# Patient Record
Sex: MALE | Race: WHITE | NOT HISPANIC OR LATINO | Employment: FULL TIME | URBAN - METROPOLITAN AREA
[De-identification: names, ages, dates, MRNs, and addresses within clinical notes are randomized per-mention and may not be internally consistent; named-entity substitution may affect disease eponyms.]

---

## 2019-11-04 ENCOUNTER — OFFICE VISIT (OUTPATIENT)
Dept: INTERNAL MEDICINE CLINIC | Facility: CLINIC | Age: 44
End: 2019-11-04
Payer: COMMERCIAL

## 2019-11-04 VITALS
OXYGEN SATURATION: 98 % | WEIGHT: 207.8 LBS | SYSTOLIC BLOOD PRESSURE: 132 MMHG | RESPIRATION RATE: 14 BRPM | HEART RATE: 88 BPM | DIASTOLIC BLOOD PRESSURE: 80 MMHG | BODY MASS INDEX: 29.82 KG/M2

## 2019-11-04 DIAGNOSIS — N52.9 VASCULOGENIC ERECTILE DYSFUNCTION, UNSPECIFIED VASCULOGENIC ERECTILE DYSFUNCTION TYPE: ICD-10-CM

## 2019-11-04 DIAGNOSIS — K40.90 RIGHT INGUINAL HERNIA: ICD-10-CM

## 2019-11-04 DIAGNOSIS — Z11.4 SCREENING FOR HIV (HUMAN IMMUNODEFICIENCY VIRUS): ICD-10-CM

## 2019-11-04 DIAGNOSIS — Z13.1 SCREENING FOR DIABETES MELLITUS: ICD-10-CM

## 2019-11-04 DIAGNOSIS — N20.0 NEPHROLITHIASIS: Primary | ICD-10-CM

## 2019-11-04 DIAGNOSIS — N50.811 RIGHT TESTICULAR PAIN: ICD-10-CM

## 2019-11-04 DIAGNOSIS — Z12.5 SCREENING FOR PROSTATE CANCER: ICD-10-CM

## 2019-11-04 DIAGNOSIS — Z13.29 SCREENING FOR THYROID DISORDER: ICD-10-CM

## 2019-11-04 DIAGNOSIS — Z11.59 ENCOUNTER FOR HEPATITIS C SCREENING TEST FOR LOW RISK PATIENT: ICD-10-CM

## 2019-11-04 DIAGNOSIS — Z13.220 SCREENING FOR HYPERLIPIDEMIA: ICD-10-CM

## 2019-11-04 DIAGNOSIS — Z23 INFLUENZA VACCINE NEEDED: ICD-10-CM

## 2019-11-04 PROCEDURE — 90686 IIV4 VACC NO PRSV 0.5 ML IM: CPT

## 2019-11-04 PROCEDURE — 99203 OFFICE O/P NEW LOW 30 MIN: CPT | Performed by: INTERNAL MEDICINE

## 2019-11-04 PROCEDURE — 90471 IMMUNIZATION ADMIN: CPT

## 2019-11-04 NOTE — PROGRESS NOTES
Assessment/Plan:    #Right Inguinal Pain  -present for several years   -reports dull ache and soreness in groin area down to testicle  -denies penile discharge, relation to exercise, swelling  -will obtain US imaging  -hernia exam possible positive for small hernia    #Vasectomy  -underwent previous surgery    #Erectile Dysfunction  -reports low libido with inability to perform  -will obtain testosterone  -defers ED meds for now    #Nephrolithiasis  -previous history with 2 stones requiring pain medication for treatment  -remains asymptomatic and drinks lots of water  -will obtain US imaging    #Obesity  BMI Counseling: Body mass index is 29 82 kg/m²  Discussed the patient's BMI with him  The BMI is above normal  Nutrition recommendations include decreasing overall calorie intake, 3-5 servings of fruits/vegetables daily and reducing fast food intake  Exercise recommendations include vigorous aerobic physical activity for 75 minutes/week  #Health Maintenance  -routine labs and return to care 1 year  -flu vaccine up to date today    Addendum 11/07/2019 testosterone 595, PSA 0 6, HIV and hepatitis negative, , HDL 86, TSH 1 25, A1c 5 4 patient opted for rectal dysfunction medications and gave him prescriptions for Cialis as well as viagra and he will check with his pharmacy to see which one is cheaper    Addendum 11/12/19 US abdomen unremarkable  US groin shows left varicocele and right renal cyst  Reports severe testicular pain on occasion and we will refer to urology  No problem-specific Assessment & Plan notes found for this encounter  Diagnoses and all orders for this visit:    Nephrolithiasis  -     US kidney and bladder; Future  -     CBC and differential; Future  -     Lipid Panel with Direct LDL reflex; Future  -     Hemoglobin A1C; Future  -     TSH, 3rd generation with Free T4 reflex; Future  -     PSA, total and free; Future  -     Comprehensive metabolic panel;  Future    Vasculogenic erectile dysfunction, unspecified vasculogenic erectile dysfunction type  -     Testosterone, free, total; Future  -     CBC and differential; Future  -     Lipid Panel with Direct LDL reflex; Future  -     Hemoglobin A1C; Future  -     TSH, 3rd generation with Free T4 reflex; Future  -     PSA, total and free; Future  -     Comprehensive metabolic panel; Future    Influenza vaccine needed  -     influenza vaccine, 5513-2150, quadrivalent, 0 5 mL, preservative-free, for adult and pediatric patients 6 mos+ (AFLURIA, FLUARIX, FLULAVAL, FLUZONE)  -     CBC and differential; Future  -     Lipid Panel with Direct LDL reflex; Future  -     Hemoglobin A1C; Future  -     TSH, 3rd generation with Free T4 reflex; Future  -     PSA, total and free; Future  -     Comprehensive metabolic panel; Future    Screening for diabetes mellitus  -     CBC and differential; Future  -     Lipid Panel with Direct LDL reflex; Future  -     Hemoglobin A1C; Future  -     TSH, 3rd generation with Free T4 reflex; Future  -     PSA, total and free; Future  -     Comprehensive metabolic panel; Future    Screening for hyperlipidemia  -     CBC and differential; Future  -     Lipid Panel with Direct LDL reflex; Future  -     Hemoglobin A1C; Future  -     TSH, 3rd generation with Free T4 reflex; Future  -     PSA, total and free; Future  -     Comprehensive metabolic panel; Future    Screening for prostate cancer  -     CBC and differential; Future  -     Lipid Panel with Direct LDL reflex; Future  -     Hemoglobin A1C; Future  -     TSH, 3rd generation with Free T4 reflex; Future  -     PSA, total and free; Future  -     Comprehensive metabolic panel; Future    Screening for thyroid disorder  -     CBC and differential; Future  -     Lipid Panel with Direct LDL reflex; Future  -     Hemoglobin A1C; Future  -     TSH, 3rd generation with Free T4 reflex; Future  -     PSA, total and free;  Future  -     Comprehensive metabolic panel; Future    Encounter for hepatitis C screening test for low risk patient  -     CBC and differential; Future  -     Lipid Panel with Direct LDL reflex; Future  -     Hemoglobin A1C; Future  -     TSH, 3rd generation with Free T4 reflex; Future  -     PSA, total and free; Future  -     Comprehensive metabolic panel; Future  -     Chronic Hepatitis Panel; Future    Screening for HIV (human immunodeficiency virus)  -     CBC and differential; Future  -     Lipid Panel with Direct LDL reflex; Future  -     Hemoglobin A1C; Future  -     TSH, 3rd generation with Free T4 reflex; Future  -     PSA, total and free; Future  -     Comprehensive metabolic panel; Future  -     HIV 1/2 AG-AB combo; Future    Right inguinal hernia  -     US scrotum and groin area; Future  -     CBC and differential; Future  -     Lipid Panel with Direct LDL reflex; Future  -     Hemoglobin A1C; Future  -     TSH, 3rd generation with Free T4 reflex; Future  -     PSA, total and free; Future  -     Comprehensive metabolic panel; Future          No current outpatient medications on file  Subjective:      Patient ID: Gene Vogel is a 40 y o  male  HPI     Patient presents for a new patient visit  Reports that he has not seen a primary care physician in many years and wanted to get evaluated  He states that he has been experiencing a right inguinal pain for the past several years  He denies any association with exercise however states that it feels like a soreness that radiates down to his testicle  He denies any swelling or bulge is however her knee exam today was noted for a mild small hernia  Will obtain ultrasound imaging of this to clarify this  He states that the pain comes down every couple weeks and usually dissipates on its own after a few days  Of note patient also has a history of erectile dysfunction is deferring any medications for this    He states that he does not feel of the toe and we will obtain a testosterone level to evaluate for this  Patient also has a history of renal stones and went to the emergency room twice for he received pain medications and passed a stone approximately 2-3 days later  States that he continues to drink a lot a water it remains hydrated  We will obtain an ultrasound imaging of his renal system to evaluate for repeat stone  Patient also has a history of a vasectomy however no other surgeries and no other medical problems  He denies any drug allergies  He does not take any medications at this time  Denies any smoking, drug use  He states that he drinks alcohol on a weekly basis on occasion  Received a flu vaccine today  Reports a family history of melanoma in his mother and pancreatic cancer in his grandmother  He states that he continues to diet and exercise on a daily basis  Patient will return to care in 1 year however he will obtain routine labs today  The following portions of the patient's history were reviewed and updated as appropriate: allergies, current medications, past family history, past medical history, past social history, past surgical history and problem list     Review of Systems   Constitutional: Negative for activity change, appetite change, fatigue and fever  HENT: Negative for congestion, ear pain, hearing loss, sore throat and tinnitus  Eyes: Negative for photophobia, pain and visual disturbance  Respiratory: Negative for cough, shortness of breath and wheezing  Cardiovascular: Negative for chest pain, palpitations and leg swelling  Gastrointestinal: Negative for abdominal distention, abdominal pain, constipation, diarrhea, nausea and vomiting  Genitourinary: Positive for testicular pain  Negative for difficulty urinating, flank pain, frequency, hematuria, penile swelling and scrotal swelling          Right groin pain, erectile dysfunction   Musculoskeletal: Negative for arthralgias, back pain, gait problem, joint swelling, myalgias, neck pain and neck stiffness  Skin: Negative for color change, pallor, rash and wound  Neurological: Negative for dizziness, tremors, syncope, weakness, light-headedness, numbness and headaches  Hematological: Does not bruise/bleed easily  Objective:      /80 (BP Location: Left arm, Patient Position: Sitting, Cuff Size: Adult)   Pulse 88   Resp 14   Wt 94 3 kg (207 lb 12 8 oz)   SpO2 98%   BMI 29 82 kg/m²          Physical Exam   Constitutional: He is oriented to person, place, and time  He appears well-developed and well-nourished  HENT:   Head: Normocephalic and atraumatic  Right Ear: External ear normal    Left Ear: External ear normal    Nose: Nose normal    Mouth/Throat: Oropharynx is clear and moist    Eyes: Pupils are equal, round, and reactive to light  Conjunctivae and EOM are normal    Neck: Normal range of motion  Neck supple  No JVD present  No thyromegaly present  Cardiovascular: Normal rate, regular rhythm, normal heart sounds and intact distal pulses  No murmur heard  Pulmonary/Chest: Effort normal and breath sounds normal  No stridor  No respiratory distress  He has no wheezes  He has no rales  He exhibits no tenderness  Abdominal: Soft  Bowel sounds are normal  He exhibits no distension and no mass  There is no tenderness  There is no rebound and no guarding  A hernia (possible hernia right inguinal) is present  Genitourinary: Penis normal    Musculoskeletal: Normal range of motion  He exhibits no edema, tenderness or deformity  Lymphadenopathy:     He has no cervical adenopathy  Neurological: He is alert and oriented to person, place, and time  He has normal reflexes  Skin: Skin is warm and dry  No rash noted  No erythema  No pallor  Vitals reviewed

## 2019-11-05 ENCOUNTER — APPOINTMENT (OUTPATIENT)
Dept: LAB | Facility: AMBULARY SURGERY CENTER | Age: 44
End: 2019-11-05
Payer: COMMERCIAL

## 2019-11-05 DIAGNOSIS — N20.0 NEPHROLITHIASIS: ICD-10-CM

## 2019-11-05 DIAGNOSIS — N52.9 VASCULOGENIC ERECTILE DYSFUNCTION, UNSPECIFIED VASCULOGENIC ERECTILE DYSFUNCTION TYPE: ICD-10-CM

## 2019-11-05 DIAGNOSIS — Z23 INFLUENZA VACCINE NEEDED: ICD-10-CM

## 2019-11-05 DIAGNOSIS — K40.90 RIGHT INGUINAL HERNIA: ICD-10-CM

## 2019-11-05 DIAGNOSIS — Z13.220 SCREENING FOR HYPERLIPIDEMIA: ICD-10-CM

## 2019-11-05 DIAGNOSIS — Z11.4 SCREENING FOR HIV (HUMAN IMMUNODEFICIENCY VIRUS): ICD-10-CM

## 2019-11-05 DIAGNOSIS — Z13.29 SCREENING FOR THYROID DISORDER: ICD-10-CM

## 2019-11-05 DIAGNOSIS — Z12.5 SCREENING FOR PROSTATE CANCER: ICD-10-CM

## 2019-11-05 DIAGNOSIS — Z13.1 SCREENING FOR DIABETES MELLITUS: ICD-10-CM

## 2019-11-05 DIAGNOSIS — Z11.59 ENCOUNTER FOR HEPATITIS C SCREENING TEST FOR LOW RISK PATIENT: ICD-10-CM

## 2019-11-05 LAB
ALBUMIN SERPL BCP-MCNC: 4.2 G/DL (ref 3.5–5)
ALP SERPL-CCNC: 78 U/L (ref 46–116)
ALT SERPL W P-5'-P-CCNC: 23 U/L (ref 12–78)
ANION GAP SERPL CALCULATED.3IONS-SCNC: 6 MMOL/L (ref 4–13)
AST SERPL W P-5'-P-CCNC: 16 U/L (ref 5–45)
BASOPHILS # BLD AUTO: 0.05 THOUSANDS/ΜL (ref 0–0.1)
BASOPHILS NFR BLD AUTO: 1 % (ref 0–1)
BILIRUB SERPL-MCNC: 0.79 MG/DL (ref 0.2–1)
BUN SERPL-MCNC: 17 MG/DL (ref 5–25)
CALCIUM SERPL-MCNC: 9.2 MG/DL (ref 8.3–10.1)
CHLORIDE SERPL-SCNC: 104 MMOL/L (ref 100–108)
CHOLEST SERPL-MCNC: 199 MG/DL (ref 50–200)
CO2 SERPL-SCNC: 28 MMOL/L (ref 21–32)
CREAT SERPL-MCNC: 1.07 MG/DL (ref 0.6–1.3)
EOSINOPHIL # BLD AUTO: 0.17 THOUSAND/ΜL (ref 0–0.61)
EOSINOPHIL NFR BLD AUTO: 4 % (ref 0–6)
ERYTHROCYTE [DISTWIDTH] IN BLOOD BY AUTOMATED COUNT: 12.6 % (ref 11.6–15.1)
EST. AVERAGE GLUCOSE BLD GHB EST-MCNC: 108 MG/DL
GFR SERPL CREATININE-BSD FRML MDRD: 84 ML/MIN/1.73SQ M
GLUCOSE P FAST SERPL-MCNC: 99 MG/DL (ref 65–99)
HBA1C MFR BLD: 5.4 % (ref 4.2–6.3)
HBV CORE AB SER QL: NORMAL
HBV CORE IGM SER QL: NORMAL
HBV SURFACE AG SER QL: NORMAL
HCT VFR BLD AUTO: 44.4 % (ref 36.5–49.3)
HCV AB SER QL: NORMAL
HDLC SERPL-MCNC: 86 MG/DL
HGB BLD-MCNC: 14.5 G/DL (ref 12–17)
IMM GRANULOCYTES # BLD AUTO: 0 THOUSAND/UL (ref 0–0.2)
IMM GRANULOCYTES NFR BLD AUTO: 0 % (ref 0–2)
LDLC SERPL CALC-MCNC: 103 MG/DL (ref 0–100)
LYMPHOCYTES # BLD AUTO: 1.03 THOUSANDS/ΜL (ref 0.6–4.47)
LYMPHOCYTES NFR BLD AUTO: 23 % (ref 14–44)
MCH RBC QN AUTO: 30.9 PG (ref 26.8–34.3)
MCHC RBC AUTO-ENTMCNC: 32.7 G/DL (ref 31.4–37.4)
MCV RBC AUTO: 95 FL (ref 82–98)
MONOCYTES # BLD AUTO: 0.5 THOUSAND/ΜL (ref 0.17–1.22)
MONOCYTES NFR BLD AUTO: 11 % (ref 4–12)
NEUTROPHILS # BLD AUTO: 2.79 THOUSANDS/ΜL (ref 1.85–7.62)
NEUTS SEG NFR BLD AUTO: 61 % (ref 43–75)
NRBC BLD AUTO-RTO: 0 /100 WBCS
PLATELET # BLD AUTO: 213 THOUSANDS/UL (ref 149–390)
PMV BLD AUTO: 10.2 FL (ref 8.9–12.7)
POTASSIUM SERPL-SCNC: 4.2 MMOL/L (ref 3.5–5.3)
PROT SERPL-MCNC: 7.4 G/DL (ref 6.4–8.2)
RBC # BLD AUTO: 4.7 MILLION/UL (ref 3.88–5.62)
SODIUM SERPL-SCNC: 138 MMOL/L (ref 136–145)
TRIGL SERPL-MCNC: 52 MG/DL
TSH SERPL DL<=0.05 MIU/L-ACNC: 1.25 UIU/ML (ref 0.36–3.74)
WBC # BLD AUTO: 4.54 THOUSAND/UL (ref 4.31–10.16)

## 2019-11-05 PROCEDURE — 87340 HEPATITIS B SURFACE AG IA: CPT

## 2019-11-05 PROCEDURE — 84402 ASSAY OF FREE TESTOSTERONE: CPT

## 2019-11-05 PROCEDURE — 87389 HIV-1 AG W/HIV-1&-2 AB AG IA: CPT

## 2019-11-05 PROCEDURE — 85025 COMPLETE CBC W/AUTO DIFF WBC: CPT

## 2019-11-05 PROCEDURE — 84403 ASSAY OF TOTAL TESTOSTERONE: CPT

## 2019-11-05 PROCEDURE — 36415 COLL VENOUS BLD VENIPUNCTURE: CPT

## 2019-11-05 PROCEDURE — 84154 ASSAY OF PSA FREE: CPT

## 2019-11-05 PROCEDURE — 80053 COMPREHEN METABOLIC PANEL: CPT

## 2019-11-05 PROCEDURE — 84153 ASSAY OF PSA TOTAL: CPT

## 2019-11-05 PROCEDURE — 80061 LIPID PANEL: CPT

## 2019-11-05 PROCEDURE — 83036 HEMOGLOBIN GLYCOSYLATED A1C: CPT

## 2019-11-05 PROCEDURE — 86704 HEP B CORE ANTIBODY TOTAL: CPT

## 2019-11-05 PROCEDURE — 84443 ASSAY THYROID STIM HORMONE: CPT

## 2019-11-05 PROCEDURE — 86705 HEP B CORE ANTIBODY IGM: CPT

## 2019-11-05 PROCEDURE — 86803 HEPATITIS C AB TEST: CPT

## 2019-11-06 LAB
HIV 1+2 AB+HIV1 P24 AG SERPL QL IA: NORMAL
PSA FREE MFR SERPL: 35 %
PSA FREE SERPL-MCNC: 0.21 NG/ML
PSA SERPL-MCNC: 0.6 NG/ML (ref 0–4)
TESTOST FREE SERPL-MCNC: 9.6 PG/ML (ref 6.8–21.5)
TESTOST SERPL-MCNC: 595 NG/DL (ref 264–916)

## 2019-11-11 ENCOUNTER — HOSPITAL ENCOUNTER (OUTPATIENT)
Dept: ULTRASOUND IMAGING | Facility: HOSPITAL | Age: 44
Discharge: HOME | End: 2019-11-11
Payer: COMMERCIAL

## 2019-11-11 DIAGNOSIS — K40.90 RIGHT INGUINAL HERNIA: ICD-10-CM

## 2019-11-11 DIAGNOSIS — N20.0 NEPHROLITHIASIS: ICD-10-CM

## 2019-11-11 PROCEDURE — 76870 US EXAM SCROTUM: CPT

## 2019-11-11 PROCEDURE — 76770 US EXAM ABDO BACK WALL COMP: CPT

## 2021-03-31 DIAGNOSIS — Z23 ENCOUNTER FOR IMMUNIZATION: ICD-10-CM

## 2021-08-23 ENCOUNTER — TELEPHONE (OUTPATIENT)
Dept: INTERNAL MEDICINE CLINIC | Facility: CLINIC | Age: 46
End: 2021-08-23

## 2022-08-21 ENCOUNTER — OFFICE VISIT (OUTPATIENT)
Dept: URGENT CARE | Facility: MEDICAL CENTER | Age: 47
End: 2022-08-21
Payer: COMMERCIAL

## 2022-08-21 VITALS
BODY MASS INDEX: 34.44 KG/M2 | HEART RATE: 84 BPM | TEMPERATURE: 98 F | WEIGHT: 246 LBS | SYSTOLIC BLOOD PRESSURE: 142 MMHG | HEIGHT: 71 IN | OXYGEN SATURATION: 96 % | RESPIRATION RATE: 18 BRPM | DIASTOLIC BLOOD PRESSURE: 88 MMHG

## 2022-08-21 DIAGNOSIS — S91.209A AVULSION OF TOENAIL, INITIAL ENCOUNTER: Primary | ICD-10-CM

## 2022-08-21 PROCEDURE — G0382 LEV 3 HOSP TYPE B ED VISIT: HCPCS | Performed by: PHYSICIAN ASSISTANT

## 2022-08-21 NOTE — PATIENT INSTRUCTIONS
Avulsion toenail  Follow up with PCP in 3-5 days  Proceed to  ER if symptoms worsen  Nail Avulsion   WHAT YOU NEED TO KNOW:   Nail avulsion is when part or all of a nail is torn away or removed from the nail bed  Avulsion may happen on your finger or toe  Common causes include ingrown nail, injury, or infection  The nail bed will form a hard layer and then a new nail may grow  The nail bed will be sensitive until the hard layer forms  You will need to keep it covered to prevent infection or more injury  You may need to care for your nail area for several months as the new nail grows  DISCHARGE INSTRUCTIONS:   Return to the emergency department if:   Blood soaks through your bandage  You have a red streak running up your leg or arm  Contact your healthcare provider if:   You have a fever or chills  Your injured area is red, swollen, or draining pus  You have new or worsening pain, or pain that does not get better with medicine  You have questions or concerns about your condition or care  Medicines:   Antibiotics  may help treat or prevent a bacterial infection  Acetaminophen  decreases pain and fever  It is available without a doctor's order  Ask how much to take and how often to take it  Follow directions  Acetaminophen can cause liver damage if not taken correctly  NSAIDs , such as ibuprofen, help decrease swelling, pain, and fever  This medicine is available with or without a doctor's order  NSAIDs can cause stomach bleeding or kidney problems in certain people  If you take blood thinner medicine, always ask your healthcare provider if NSAIDs are safe for you  Always read the medicine label and follow directions  Take your medicine as directed  Contact your healthcare provider if you think your medicine is not helping or if you have side effects  Tell him or her if you are allergic to any medicine  Keep a list of the medicines, vitamins, and herbs you take   Include the amounts, and when and why you take them  Bring the list or the pill bottles to follow-up visits  Carry your medicine list with you in case of an emergency  Self-care:   Keep your nail area clean, dry, and covered  When you are allowed to bathe, carefully wash the area with soap and water  Put on a clean, new bandage  Do not use an adhesive bandage  It may stick to the wound and cause pain when you remove it  Ask your healthcare provider what kind of bandage to use  Change your bandage when it gets wet or dirty  Your healthcare provider may suggest that you change the bandage every 24 hours for the first few days  Elevate  your hand or foot above the level of your heart as often as you can for 24 hours  This will help decrease swelling and pain  Prop your hand or foot on pillows or blankets to keep it elevated comfortably  Apply ice  on your wound area for 15 to 20 minutes every hour or as directed  Use an ice pack, or put crushed ice in a plastic bag  Cover it with a towel  Ice helps prevent tissue damage and decreases swelling and pain  Do not wear tight shoes  or shoes that do not fit well  Do not wear tights or pantyhose  Wear cotton socks  Ask  when you can return to work, school, or your usual sports and activities  Follow up with your healthcare provider as directed: You may be referred to a hand or foot specialist  Write down your questions so you remember to ask them during your visits  © Copyright [employer] 2022 Information is for End User's use only and may not be sold, redistributed or otherwise used for commercial purposes  All illustrations and images included in CareNotes® are the copyrighted property of A D A M , Inc  or Grace Florez  The above information is an  only  It is not intended as medical advice for individual conditions or treatments   Talk to your doctor, nurse or pharmacist before following any medical regimen to see if it is safe and effective for you

## 2022-08-21 NOTE — PROGRESS NOTES
[facility]        NAME: Carroll Fleming is a 55 y o  male  : 1975    MRN: [id number]  DATE: 2022  TIME: 5:56 PM    Assessment and Plan   Avulsion of toenail, initial encounter [S91 [id number]]  1  Avulsion of toenail, initial encounter           Patient Instructions     Avulsion toenail  Follow up with PCP in 3-5 days  Proceed to  ER if symptoms worsen  Chief Complaint     Chief Complaint   Patient presents with    Toe Injury     Pt  Stubbed his left great toe a couple months ago  He is concerned about his toenail  History of Present Illness       69-year-old male who presents complaining of toenail falling off  Patient states that he stumped his toe approximately 2 months ago and today he noticed that the nail was loose  Patient denies redness, pain, swelling, discharge      Review of Systems   Review of Systems   Constitutional: Negative  HENT: Negative  Eyes: Negative  Respiratory: Negative  Negative for apnea, cough, choking, chest tightness, shortness of breath, wheezing and stridor  Cardiovascular: Negative  Negative for chest pain  Musculoskeletal: Positive for arthralgias  Current Medications     No current outpatient medications on file  Current Allergies     Allergies as of 2022    (No Known Allergies)            The following portions of the patient's history were reviewed and updated as appropriate: allergies, current medications, past family history, past medical history, past social history, past surgical history and problem list      Past Medical History:   Diagnosis Date    Arthritis 2005    Kidney stone 2009       No past surgical history on file  Family History   Problem Relation Age of Onset    Arthritis Mother         knees    Cancer Mother         melanoma    Arthritis Father         knees    Cancer Maternal Grandmother         pancreatic cancer         Medications have been verified          Objective   /88 Pulse 84   Temp 98 °F (36 7 °C)   Resp 18   Ht 5' 11" (1 803 m)   Wt 112 kg (246 lb)   SpO2 96%   BMI 34 31 kg/m²        Physical Exam     Physical Exam  Constitutional:       General: He is not in acute distress  Appearance: He is well-developed  He is not diaphoretic  Cardiovascular:      Rate and Rhythm: Normal rate and regular rhythm  Heart sounds: Normal heart sounds  Pulmonary:      Effort: Pulmonary effort is normal  No respiratory distress  Breath sounds: Normal breath sounds  No wheezing or rales  Chest:      Chest wall: No tenderness  Musculoskeletal:      Cervical back: Normal range of motion and neck supple  Legs:    Lymphadenopathy:      Cervical: No cervical adenopathy

## 2024-10-01 NOTE — PROGRESS NOTES
Ambulatory Visit  Name: Oscar Sellers      : 1975      MRN: [id number]  Encounter Provider: Alejandro Nj MD  Encounter Date: 10/2/2024   Encounter department: [facility]    Assessment & Plan  Well adult exam           Depression Screening and Follow-up Plan: Patient was screened for depression during today's encounter. They screened negative with a PHQ-2 score of 0.  History of Present Illness     HPI      Review of Systems        Objective     There were no vitals taken for this visit.    Physical Exam

## 2024-10-02 ENCOUNTER — OFFICE VISIT (OUTPATIENT)
Dept: FAMILY MEDICINE CLINIC | Facility: CLINIC | Age: 49
End: 2024-10-02
Payer: COMMERCIAL

## 2024-10-02 VITALS
SYSTOLIC BLOOD PRESSURE: 136 MMHG | DIASTOLIC BLOOD PRESSURE: 90 MMHG | HEIGHT: 71 IN | TEMPERATURE: 99.3 F | HEART RATE: 79 BPM | WEIGHT: 191 LBS | BODY MASS INDEX: 26.74 KG/M2 | OXYGEN SATURATION: 99 %

## 2024-10-02 DIAGNOSIS — R53.83 OTHER FATIGUE: ICD-10-CM

## 2024-10-02 DIAGNOSIS — Z13.220 SCREENING CHOLESTEROL LEVEL: ICD-10-CM

## 2024-10-02 DIAGNOSIS — M75.52 BURSITIS OF LEFT SHOULDER: Primary | ICD-10-CM

## 2024-10-02 PROCEDURE — 99213 OFFICE O/P EST LOW 20 MIN: CPT | Performed by: FAMILY MEDICINE

## 2024-10-02 RX ORDER — NAPROXEN 500 MG/1
500 TABLET ORAL 2 TIMES DAILY WITH MEALS
Qty: 20 TABLET | Refills: 1 | Status: SHIPPED | OUTPATIENT
Start: 2024-10-02

## 2024-10-03 ENCOUNTER — APPOINTMENT (OUTPATIENT)
Dept: LAB | Facility: MEDICAL CENTER | Age: 49
End: 2024-10-03
Payer: COMMERCIAL

## 2024-10-03 ENCOUNTER — APPOINTMENT (OUTPATIENT)
Dept: RADIOLOGY | Facility: MEDICAL CENTER | Age: 49
End: 2024-10-03
Payer: COMMERCIAL

## 2024-10-03 DIAGNOSIS — M75.52 BURSITIS OF LEFT SHOULDER: ICD-10-CM

## 2024-10-03 DIAGNOSIS — R53.83 OTHER FATIGUE: ICD-10-CM

## 2024-10-03 DIAGNOSIS — Z13.220 SCREENING CHOLESTEROL LEVEL: ICD-10-CM

## 2024-10-03 LAB
ALBUMIN SERPL BCG-MCNC: 4.3 G/DL (ref 3.5–5)
ALP SERPL-CCNC: 64 U/L (ref 34–104)
ALT SERPL W P-5'-P-CCNC: 28 U/L (ref 7–52)
ANION GAP SERPL CALCULATED.3IONS-SCNC: 6 MMOL/L (ref 4–13)
AST SERPL W P-5'-P-CCNC: 25 U/L (ref 13–39)
BASOPHILS # BLD AUTO: 0.03 THOUSANDS/ΜL (ref 0–0.1)
BASOPHILS NFR BLD AUTO: 1 % (ref 0–1)
BILIRUB SERPL-MCNC: 0.44 MG/DL (ref 0.2–1)
BILIRUB UR QL STRIP: NEGATIVE
BUN SERPL-MCNC: 28 MG/DL (ref 5–25)
CALCIUM SERPL-MCNC: 8.9 MG/DL (ref 8.4–10.2)
CHLORIDE SERPL-SCNC: 103 MMOL/L (ref 96–108)
CHOLEST SERPL-MCNC: 172 MG/DL
CLARITY UR: CLEAR
CO2 SERPL-SCNC: 30 MMOL/L (ref 21–32)
COLOR UR: COLORLESS
CREAT SERPL-MCNC: 1.09 MG/DL (ref 0.6–1.3)
EOSINOPHIL # BLD AUTO: 0.08 THOUSAND/ΜL (ref 0–0.61)
EOSINOPHIL NFR BLD AUTO: 2 % (ref 0–6)
ERYTHROCYTE [DISTWIDTH] IN BLOOD BY AUTOMATED COUNT: 12.3 % (ref 11.6–15.1)
GFR SERPL CREATININE-BSD FRML MDRD: 79 ML/MIN/1.73SQ M
GLUCOSE P FAST SERPL-MCNC: 90 MG/DL (ref 65–99)
GLUCOSE UR STRIP-MCNC: NEGATIVE MG/DL
HCT VFR BLD AUTO: 43.6 % (ref 36.5–49.3)
HDLC SERPL-MCNC: 74 MG/DL
HGB BLD-MCNC: 14.2 G/DL (ref 12–17)
HGB UR QL STRIP.AUTO: NEGATIVE
IMM GRANULOCYTES # BLD AUTO: 0.01 THOUSAND/UL (ref 0–0.2)
IMM GRANULOCYTES NFR BLD AUTO: 0 % (ref 0–2)
KETONES UR STRIP-MCNC: NEGATIVE MG/DL
LDLC SERPL CALC-MCNC: 90 MG/DL (ref 0–100)
LEUKOCYTE ESTERASE UR QL STRIP: NEGATIVE
LYMPHOCYTES # BLD AUTO: 2.05 THOUSANDS/ΜL (ref 0.6–4.47)
LYMPHOCYTES NFR BLD AUTO: 41 % (ref 14–44)
MAGNESIUM SERPL-MCNC: 2.3 MG/DL (ref 1.9–2.7)
MCH RBC QN AUTO: 31.6 PG (ref 26.8–34.3)
MCHC RBC AUTO-ENTMCNC: 32.6 G/DL (ref 31.4–37.4)
MCV RBC AUTO: 97 FL (ref 82–98)
MONOCYTES # BLD AUTO: 0.66 THOUSAND/ΜL (ref 0.17–1.22)
MONOCYTES NFR BLD AUTO: 13 % (ref 4–12)
NEUTROPHILS # BLD AUTO: 2.12 THOUSANDS/ΜL (ref 1.85–7.62)
NEUTS SEG NFR BLD AUTO: 43 % (ref 43–75)
NITRITE UR QL STRIP: NEGATIVE
NRBC BLD AUTO-RTO: 0 /100 WBCS
PH UR STRIP.AUTO: 6.5 [PH]
PLATELET # BLD AUTO: 253 THOUSANDS/UL (ref 149–390)
PMV BLD AUTO: 10.3 FL (ref 8.9–12.7)
POTASSIUM SERPL-SCNC: 4.7 MMOL/L (ref 3.5–5.3)
PROT SERPL-MCNC: 6.5 G/DL (ref 6.4–8.4)
PROT UR STRIP-MCNC: NEGATIVE MG/DL
RBC # BLD AUTO: 4.49 MILLION/UL (ref 3.88–5.62)
SODIUM SERPL-SCNC: 139 MMOL/L (ref 135–147)
SP GR UR STRIP.AUTO: 1.01 (ref 1–1.03)
TRIGL SERPL-MCNC: 41 MG/DL
TSH SERPL DL<=0.05 MIU/L-ACNC: 1.32 UIU/ML (ref 0.45–4.5)
URATE SERPL-MCNC: 3.4 MG/DL (ref 3.5–8.5)
UROBILINOGEN UR STRIP-ACNC: <2 MG/DL
WBC # BLD AUTO: 4.95 THOUSAND/UL (ref 4.31–10.16)

## 2024-10-03 PROCEDURE — 73030 X-RAY EXAM OF SHOULDER: CPT

## 2024-10-03 PROCEDURE — 80061 LIPID PANEL: CPT

## 2024-10-03 PROCEDURE — 84443 ASSAY THYROID STIM HORMONE: CPT

## 2024-10-03 PROCEDURE — 80053 COMPREHEN METABOLIC PANEL: CPT

## 2024-10-03 PROCEDURE — 81003 URINALYSIS AUTO W/O SCOPE: CPT

## 2024-10-03 PROCEDURE — 83735 ASSAY OF MAGNESIUM: CPT

## 2024-10-03 PROCEDURE — 84550 ASSAY OF BLOOD/URIC ACID: CPT

## 2024-10-03 PROCEDURE — 36415 COLL VENOUS BLD VENIPUNCTURE: CPT

## 2024-10-03 PROCEDURE — 85025 COMPLETE CBC W/AUTO DIFF WBC: CPT

## 2024-12-09 DIAGNOSIS — M75.52 BURSITIS OF LEFT SHOULDER: Primary | ICD-10-CM

## 2024-12-16 ENCOUNTER — OFFICE VISIT (OUTPATIENT)
Dept: OBGYN CLINIC | Facility: CLINIC | Age: 49
End: 2024-12-16
Payer: COMMERCIAL

## 2024-12-16 VITALS
HEIGHT: 71 IN | RESPIRATION RATE: 18 BRPM | HEART RATE: 67 BPM | OXYGEN SATURATION: 99 % | BODY MASS INDEX: 26.1 KG/M2 | DIASTOLIC BLOOD PRESSURE: 80 MMHG | WEIGHT: 186.4 LBS | SYSTOLIC BLOOD PRESSURE: 126 MMHG

## 2024-12-16 DIAGNOSIS — M75.52 BURSITIS OF LEFT SHOULDER: ICD-10-CM

## 2024-12-16 DIAGNOSIS — M75.42 IMPINGEMENT SYNDROME OF LEFT SHOULDER: Primary | ICD-10-CM

## 2024-12-16 PROCEDURE — 99203 OFFICE O/P NEW LOW 30 MIN: CPT | Performed by: STUDENT IN AN ORGANIZED HEALTH CARE EDUCATION/TRAINING PROGRAM

## 2024-12-16 RX ORDER — MELOXICAM 7.5 MG/1
7.5 TABLET ORAL DAILY
Qty: 30 TABLET | Refills: 2 | Status: SHIPPED | OUTPATIENT
Start: 2024-12-16

## 2024-12-16 NOTE — PROGRESS NOTES
ASSESSMENT/PLAN:    Diagnoses and all orders for this visit:    Impingement syndrome of left shoulder  -     Ambulatory Referral to Physical Therapy; Future  -     meloxicam (Mobic) 7.5 mg tablet; Take 1 tablet (7.5 mg total) by mouth daily    Bursitis of left shoulder  -     Ambulatory Referral to Orthopedic Surgery      Discussed history, exam, and imaging with patient. Presentation most consistent with left shoulder impingement and we will plan for non-operative management at this time.  Discussed oral/topical medication regimen. Will plan for oral meloxicam and tylenol. Also discussed topical Voltaren gel.  Discussed injections as a pain adjunct. Discussed CSI into the subacromial space which can be considered if he continues to have pain with conservative treatment.  Discussed rehabilitation efforts. Will plan for formal physical therapy for periscapular strengthening.  Discussed advanced imaging in the form of MRI. Discussed that MRI can be considered if he fails conservative treatments. No indication for MRI currently as there is not a concern for large rotator cuff tear given his history and exam.  Follow-up with me in 7 weeks after 6 week course of physical therapy.  _____________________________________________________  CHIEF COMPLAINT:  Chief Complaint   Patient presents with    Left Shoulder - Pain     Patient dealing with pain for a couple of months. Patient states that he took naproxen and showed some relief and now is having sharp pains          SUBJECTIVE:  Oscar Sellers is a 49 y.o. year old male, who presents for evaluation of left shoulder pain. The issue began months ago, with no specific injury. Pain is in the anterior shoulder with radiation down the anterior and lateral upper arm. He notes the pain is often sharp and is also dull at times. He notes that he does a lot of work around the house and outside in the yard. Bandar is intermittent throughout the day but typically worse after a busier  day. He denies numbness or tingling into the distal extremity. He saw his PCP who got xrays and ordered a short course of naproxen. He had pain relief for about 2 weeks but then pain returned. He denies formal physical therapy, or injections in the past.      PAST MEDICAL HISTORY:  Past Medical History:   Diagnosis Date    Arthritis     Kidney stone 2009       PAST SURGICAL HISTORY:  History reviewed. No pertinent surgical history.    FAMILY HISTORY:  Family History   Problem Relation Age of Onset    Arthritis Mother         knees    Cancer Mother         melanoma    Arthritis Father         knees    Cancer Maternal Grandmother         pancreatic cancer       SOCIAL HISTORY:  Social History     Tobacco Use    Smoking status: Former     Current packs/day: 0.00     Average packs/day: 1 pack/day for 15.0 years (15.0 ttl pk-yrs)     Types: Cigarettes     Start date: 10/2/1993     Quit date: 10/2/2008     Years since quittin.2     Passive exposure: Past    Smokeless tobacco: Never    Tobacco comments:     Never smoker - As per [place]    Vaping Use    Vaping status: Never Used   Substance Use Topics    Alcohol use: Not Currently     Alcohol/week: 2.0 standard drinks of alcohol    Drug use: Never       MEDICATIONS:    Current Outpatient Medications:     meloxicam (Mobic) 7.5 mg tablet, Take 1 tablet (7.5 mg total) by mouth daily, Disp: 30 tablet, Rfl: 2    ALLERGIES:  No Known Allergies    Review of systems:   Constitutional: Negative for fatigue, fever or loss of apetite.   HENT: Negative.    Respiratory: Negative for shortness of breath, dyspnea.    Cardiovascular: Negative for chest pain/tightness.   Gastrointestinal: Negative for abdominal pain, N/V.   Endocrine: Negative for cold/heat intolerance, unexplained weight loss/gain.   Genitourinary: Negative for flank pain, dysuria, hematuria.   Musculoskeletal: As in HPI  Skin: Negative for rash.    Neurological: Negative for numbness or  "tingling  Psychiatric/Behavioral: Negative for agitation.  _____________________________________________________  PHYSICAL EXAMINATION:    Blood pressure 126/80, pulse 67, resp. rate 18, height 5' 11\" (1.803 m), weight 84.6 kg (186 lb 6.4 oz), SpO2 99%.    General: well developed, well nourished\", alert and oriented times 3, no acute distress  HEENT: Benign, normocephalic, atraumatic  Cardiovascular: regular rate    Pulmonary: No wheezing or stridor  Abdomen: Soft, Nontender  Skin: No open wounds, erythema, rash  Neurovascular: per examination below    MUSCULOSKELETAL EXAMINATION:    Left shoulder exam  No bruising, swelling or deformity  NonTTP cervical spine, clavicle, AC joint, acromion, scapular spine, posterior joint line, deltoid, bicipital groove  TTP: anterior joint line  Active ROM  Forward flexion: 170  External rotation in adduction: 70  Internal rotation: thoracic spine  Strength Testin/5 with Harika  5/5 ER in adduction  Provocative maneuvers:   +: n/a  -: belly press, lift off, drop arm, speeds, obriens, yergasons  SILT C5-T1  2+ Radial pulse, symmetric with contralateral        _____________________________________________________  STUDIES REVIEWED:  I have personally reviewed pertinent films in PACS and my interpretation is:    Xrays of the left shoulder taken on 10/3/24 demonstrate well preserved joint space with no acute fracture or dislocation.        Ricardo Delacruz PA-C    "

## 2025-01-06 ENCOUNTER — EVALUATION (OUTPATIENT)
Dept: PHYSICAL THERAPY | Facility: CLINIC | Age: 50
End: 2025-01-06
Payer: COMMERCIAL

## 2025-01-06 DIAGNOSIS — M75.42 IMPINGEMENT SYNDROME OF LEFT SHOULDER: Primary | ICD-10-CM

## 2025-01-06 PROCEDURE — 97161 PT EVAL LOW COMPLEX 20 MIN: CPT | Performed by: PHYSICAL THERAPIST

## 2025-01-06 NOTE — PROGRESS NOTES
PT Evaluation     Today's date: 2025  Patient name: Oscar Sellers  : 1975  MRN: [id number]  Referring provider: Ricardo Delacruz PA-C  Dx:   Encounter Diagnosis     ICD-10-CM    1. Impingement syndrome of left shoulder  M75.42 Ambulatory Referral to Physical Therapy          Start Time: 1445  Stop Time: 1545  Total time in clinic (min): 60 minutes    Assessment  Impairments: abnormal muscle firing, abnormal or restricted ROM, activity intolerance, impaired physical strength, lacks appropriate home exercise program, pain with function and poor posture     Assessment details: Oscar Sellers is a 49 y.o. male who presents with insidious onset of chronic left shoulder pain. Patient describes pain with light every day activities, which can be random in nature. Patient has avoid weightlifting to prevent further injury. Examination findings demonstrate decreased RC and scapular strength with scapulothoracic dyskinesis. Patient has full, pain-free A/PROM. TTP noted over proximal biceps bilaterally.Patient's clinical presentation is consistent with RC syndrome vs impingement syndrome. Patient would benefit from skilled physical therapy to address their aforementioned impairments, improve their level of function and to improve their overall quality of life.  Understanding of Dx/Px/POC: excellent     Prognosis: excellent    Goals  Short Term Goals: to be achieved by 4 weeks  1) Patient to be independent with basic HEP.  2) Decrease pain to 4/10 at it's worst.  3) Increase UE strength by 1/2 MMT grade in all deficient planes.  4) Increase UE ROM by > 5 deg in all deficient planes.  5) Patient to report decreased sleep interruption secondary to pain.    Long Term Goals: to be achieved by discharge  1) FOTO equal to or greater than 73.  2) Patient to be independent with comprehensive HEP.  3) Abolish pain for improved quality of life.  4) Increase UE strength to 5/5 MMT grade in all deficient planes to improve  a/iadls.  5) Increase UE ROM to within 5 deg of contralateral UE to improve a/iadls.  6) Patient to report no sleep interruption secondary to pain.    Plan  Patient would benefit from: skilled PT  Planned modality interventions: biofeedback, cryotherapy, hydrotherapy, unattended electrical stimulation, thermotherapy: hydrocollator packs and low level laser therapy    Planned therapy interventions: activity modification, ADL retraining, behavior modification, body mechanics training, functional ROM exercises, home exercise program, IADL retraining, joint mobilization, manual therapy, massage, neuromuscular re-education, patient education, postural training, strengthening, stretching, therapeutic activities and therapeutic exercise    Frequency: 2-3x week.  Duration in weeks: 12  Plan of Care beginning date: 1/6/2025  Plan of Care expiration date: 3/31/2025  Treatment plan discussed with: patient        Subjective Evaluation    History of Present Illness  Mechanism of injury: Patient presents with insidious onset of left shoulder pain over the past 6 months. Patient is very active with work around the house and weightlifting. Patient iced and rested his arm. Patient has sharp pain, which can be random at times. Patient has pain reaching overhead, folding laundry, and light activities around the home. Patient has avoided strength training. Patient has intermittent popping, snapping, cracking of his shoulder, but denies experiencing instability. Patient denies experiencing tingling/numbness or weakness. Patient's quality of sleep is disrupted if he lays on either side. Patient denies experiencing loss of motion. Patient has been managing his pain with Meloxicam, Naproxen, Tylenol and Voltaren without significant relief. Patient's next f/u appointment with ortho is scheduled for 3/2/25.  Patient Goals  Patient goals for therapy: decreased pain    Pain  Current pain rating: 3  At best pain rating: 3  At worst pain rating:  "8  Location: left shoulder / arm: anteriorly  Quality: sharp, sore.  Relieving factors: ice and rest    Social Support    Employment status: working ()  Hand dominance: right      Diagnostic Tests  X-ray: normal (Left shoulder: \"No acute osseous abnormality.\")        Objective     Postural Observations  Seated posture: fair  Standing posture: fair      Tenderness     Left Shoulder   Tenderness in the biceps tendon (proximal) and bicipital groove. No tenderness in the AC joint.     Right Shoulder  Tenderness in the biceps tendon (proximal) and bicipital groove. No tenderness in the AC joint.     Active Range of Motion   Left Shoulder   Normal active range of motion    Right Shoulder   Normal active range of motion    Passive Range of Motion   Left Shoulder   Normal passive range of motion    Right Shoulder   Normal passive range of motion    Scapular Mobility   Left Shoulder   Scapular Dyskinesis: none and grade I  Scapular mobility: good  Scapular Mobility with Shoulder to 90° FF   Scapular winging: minimal    Right Shoulder   Scapular Dyskinesis: grade I and none  Scapular mobility: WFL    Strength/Myotome Testing     Left Shoulder     Planes of Motion   Flexion: 3+ ((+) pain)   Abduction: 4- ((+) pain)   External rotation at 0°: 4-   Internal rotation at 0°: 5     Isolated Muscles   Lower trapezius: 4   Middle trapezius: 4     Right Shoulder   Normal muscle strength    Left Elbow   Flexion: 5  Extension: 5    Right Elbow   Flexion: 5  Extension: 5    Left Wrist/Hand   Wrist extension: 5  Wrist flexion: 5    Right Wrist/Hand   Wrist extension: 5  Wrist flexion: 5    Tests     Left Shoulder   Negative AC shear and lift-off.     Right Shoulder   Negative AC shear and lift-off.                POC expires Unit limit Auth  expiration date PT/OT + Visit Limit?   3/31/25 1 N/A 60                 Visit/Unit Tracking  AUTH Status:  Date 1/6              N/A Used 1               Remaining              "     Precautions: standard      Manuals 1/6                                                                Neuro Re-Ed             Prone shoulder ext Reviewed            Prone h abd Reviewed            Prone scaption Reviewed            [place]: L row             Multidirectional  MB stability on wall (V, H, CW/CCW circles)                                       Ther Ex             Patient education: pathophysiology, diagnostic imaging review, gym modifications, differential diagnosis, HEP review GR            UBE             Side lying ER             TB ER             TB IR             PNF D2 UE flexion                                       Ther Activity                                       Gait Training                                       Modalities

## 2025-01-13 ENCOUNTER — OFFICE VISIT (OUTPATIENT)
Dept: PHYSICAL THERAPY | Facility: CLINIC | Age: 50
End: 2025-01-13
Payer: COMMERCIAL

## 2025-01-13 DIAGNOSIS — M75.42 IMPINGEMENT SYNDROME OF LEFT SHOULDER: Primary | ICD-10-CM

## 2025-01-13 PROCEDURE — 97110 THERAPEUTIC EXERCISES: CPT | Performed by: PHYSICAL THERAPIST

## 2025-01-13 NOTE — PROGRESS NOTES
"Daily Note     Today's date: 2025  Patient name: Oscar Sellers  : 1975  MRN: [id number]  Referring provider: Ricardo Delacruz PA-C  Dx:   Encounter Diagnosis     ICD-10-CM    1. Impingement syndrome of left shoulder  M75.42           Start Time: 08  Stop Time: 935  Total time in clinic (min): 48 minutes    Subjective: Patient reports that the snapping in his left shoulder has been less frequent and overall he has had less pain.      Objective: See treatment diary below      Assessment: Tolerated treatment well. Patient demonstrated fatigue post treatment and would benefit from continued PT. Patient with intermittent popping and pain with PNF D2 UE flexion. Deferred this exercise. Updated and reviewed HEP with progressions and modifications.      Plan: Continue per plan of care.  Progress treatment as tolerated.         POC expires Unit limit Auth  expiration date PT/OT + Visit Limit?   3/31/25 1 N/A 60                 Visit/Unit Tracking  AUTH Status:  Date              N/A Used 1 2              Remaining                  Precautions: standard      Manuals                                                                Neuro Re-Ed             Prone shoulder ext Reviewed 3# 10x3\"           Prone h abd Reviewed 3# 10x3\"           Prone scaption Reviewed 3# 10x3\"           [place]: L row  15# 2x10 3\"           Multidirectional  MB stability on wall (V, H, CW/CCW circles)  GTB x20 ea.                                     Ther Ex             Patient education: pathophysiology, diagnostic imaging review, gym modifications, differential diagnosis, HEP review GR HEP review           UBE  4' / 4'           Side lying ER  3# 2x10           TB ER  [place]: 3# 2x10           TB IR  [place]: 5# 2x10           PNF D2 UE flexion  Deferred P!                                     Ther Activity                                       Gait Training                                       Modalities                "                        Access Code: [email or web address]  URL: [email or web address]  Date: 01/13/2025  Prepared by: Kuldeep Stephenson    Exercises  - Prone Shoulder Extension - Single Arm with Dumbbell  - 1 x daily - 4 x weekly - 2-3 sets - 10 reps - 3 seconds hold  - Prone Single Arm Shoulder Horizontal Abduction with Scapular Retraction and Palm Down  - 1 x daily - 4 x weekly - 2-3 sets - 10 reps - 3 seconds hold  - Prone Single (or double) Arm Shoulder Y  - 1 x daily - 4 x weekly - 2-3 sets - 10 reps - 3 seconds hold  - Sidelying Shoulder External Rotation Dumbbell  - 1 x daily - 4 x weekly - 2-3 sets - 10 reps  - Shoulder Internal Rotation with Resistance  - 1 x daily - 4 x weekly - 2-3 sets - 10 reps  - Shoulder External Rotation with Anchored Resistance  - 1 x daily - 4 x weekly - 2-3 sets - 10 reps  - Shoulder extension with resistance - Neutral  - 1 x daily - 4 x weekly - 2-3 sets - 10 reps - 3 seconds hold  - Standing Wall Ball Circles with Mini Swiss Ball  - 1 x daily - 4 x weekly - 1 sets - 20 reps

## 2025-01-19 ENCOUNTER — TELEPHONE (OUTPATIENT)
Dept: PHYSICAL THERAPY | Facility: CLINIC | Age: 50
End: 2025-01-19

## 2025-01-20 ENCOUNTER — APPOINTMENT (OUTPATIENT)
Dept: PHYSICAL THERAPY | Facility: CLINIC | Age: 50
End: 2025-01-20
Payer: COMMERCIAL

## 2025-01-20 NOTE — TELEPHONE ENCOUNTER
Called and left voicemail detailing delayed opening due to weather. Requested Patient call clinic number to reschedule.

## 2025-01-27 ENCOUNTER — APPOINTMENT (OUTPATIENT)
Dept: PHYSICAL THERAPY | Facility: CLINIC | Age: 50
End: 2025-01-27
Payer: COMMERCIAL

## 2025-03-07 DIAGNOSIS — G89.29 CHRONIC LEFT SHOULDER PAIN: Primary | ICD-10-CM

## 2025-03-07 DIAGNOSIS — M25.512 CHRONIC LEFT SHOULDER PAIN: Primary | ICD-10-CM

## 2025-03-31 ENCOUNTER — HOSPITAL ENCOUNTER (OUTPATIENT)
Dept: MRI IMAGING | Facility: HOSPITAL | Age: 50
Discharge: HOME | End: 2025-03-31
Payer: COMMERCIAL

## 2025-03-31 DIAGNOSIS — G89.29 CHRONIC LEFT SHOULDER PAIN: ICD-10-CM

## 2025-03-31 DIAGNOSIS — M25.512 CHRONIC LEFT SHOULDER PAIN: ICD-10-CM

## 2025-03-31 PROCEDURE — 73221 MRI JOINT UPR EXTREM W/O DYE: CPT

## 2025-04-01 ENCOUNTER — RESULTS FOLLOW-UP (OUTPATIENT)
Dept: FAMILY MEDICINE CLINIC | Facility: CLINIC | Age: 50
End: 2025-04-01

## 2025-04-04 NOTE — TELEPHONE ENCOUNTER
Patient returned phone call - he is scheduled for a visit on 4/14.  No further action required.

## 2025-04-13 PROBLEM — S46.002A ROTATOR CUFF INJURY, LEFT, INITIAL ENCOUNTER: Status: ACTIVE | Noted: 2025-04-13

## 2025-04-14 ENCOUNTER — OFFICE VISIT (OUTPATIENT)
Dept: FAMILY MEDICINE CLINIC | Facility: CLINIC | Age: 50
End: 2025-04-14
Payer: COMMERCIAL

## 2025-04-14 VITALS
WEIGHT: 188 LBS | BODY MASS INDEX: 26.32 KG/M2 | TEMPERATURE: 98.1 F | SYSTOLIC BLOOD PRESSURE: 126 MMHG | OXYGEN SATURATION: 98 % | HEART RATE: 79 BPM | HEIGHT: 71 IN | DIASTOLIC BLOOD PRESSURE: 82 MMHG

## 2025-04-14 DIAGNOSIS — S46.002A ROTATOR CUFF INJURY, LEFT, INITIAL ENCOUNTER: Primary | ICD-10-CM

## 2025-04-14 PROCEDURE — 99213 OFFICE O/P EST LOW 20 MIN: CPT | Performed by: FAMILY MEDICINE

## 2025-04-14 NOTE — PROGRESS NOTES
Name: Oscar Sellers      : 1975      MRN: [id number]  Encounter Provider: Alejandro Nj MD  Encounter Date: 2025   Encounter department: [facility]  :  Assessment & Plan  Rotator cuff injury, left, initial encounter  Needs ortho input.             Depression Screening and Follow-up Plan:   Clincally patient does not have depression. No treatment is required.     History of Present Illness   49-year-old male here today for checkup on the results of his MRI of his left shoulder.  Patient still having some discomfort with shoulder pain heel that he says is better after he stopped his physical therapy.  The MRI shows he does have a tear of the supraspinatus tendon for rotator cuff injury.  Patient states he has an appointment next Monday with the [facility] to discuss his options of care for this problem.  At this time patient had no other questions and I told him about speaking with his orthopedic surgeon as to further options for care for his shoulder issue although he is probably going to need surgery.      Review of Systems   Constitutional:  Negative for activity change, appetite change, chills, fatigue, fever and unexpected weight change.   HENT:  Negative for congestion, ear pain, hearing loss, mouth sores, postnasal drip, sinus pressure, sinus pain, sneezing and sore throat.    Respiratory:  Negative for apnea, cough, shortness of breath and wheezing.    Cardiovascular:  Negative for chest pain, palpitations and leg swelling.   Gastrointestinal:  Negative for abdominal pain, constipation, diarrhea, nausea and vomiting.   Endocrine: Negative for cold intolerance and heat intolerance.   Genitourinary:  Negative for dysuria, frequency and hematuria.   Musculoskeletal:  Positive for arthralgias. Negative for back pain, gait problem, joint swelling and neck pain.   Skin:  Negative for rash.   Neurological:  Negative for dizziness, weakness and numbness.  "  Hematological:  Does not bruise/bleed easily.   Psychiatric/Behavioral:  Negative for agitation, behavioral problems, confusion, hallucinations and sleep disturbance. The patient is not nervous/anxious.        Objective   /82 (BP Location: Left arm, Patient Position: Sitting, Cuff Size: Standard)   Pulse 79   Temp 98.1 °F (36.7 °C) (Skin)   Ht 5' 11\" (1.803 m)   Wt 85.3 kg (188 lb)   SpO2 98%   BMI 26.22 kg/m²      Physical Exam  Constitutional:       Appearance: He is well-developed.   HENT:      Head: Normocephalic and atraumatic.      Right Ear: External ear normal.      Left Ear: External ear normal.      Nose: Nose normal.      Mouth/Throat:      Pharynx: Oropharynx is clear. No oropharyngeal exudate.   Eyes:      General: No scleral icterus.     Conjunctiva/sclera: Conjunctivae normal.      Pupils: Pupils are equal, round, and reactive to light.   Cardiovascular:      Rate and Rhythm: Normal rate and regular rhythm.      Heart sounds: Normal heart sounds.   Pulmonary:      Effort: Pulmonary effort is normal.      Breath sounds: Normal breath sounds. No wheezing or rales.   Abdominal:      General: Bowel sounds are normal.      Palpations: Abdomen is soft.      Tenderness: There is no abdominal tenderness. There is no guarding.   Musculoskeletal:         General: Tenderness present.      Cervical back: Normal range of motion and neck supple.   Skin:     General: Skin is warm and dry.      Findings: No erythema or rash.   Neurological:      Mental Status: He is alert and oriented to person, place, and time. Mental status is at baseline.   Psychiatric:         Mood and Affect: Mood normal.         Behavior: Behavior normal.         Thought Content: Thought content normal.         Judgment: Judgment normal.         "

## 2025-06-04 ENCOUNTER — OFFICE VISIT (OUTPATIENT)
Dept: FAMILY MEDICINE CLINIC | Facility: CLINIC | Age: 50
End: 2025-06-04
Payer: COMMERCIAL

## 2025-06-04 VITALS
TEMPERATURE: 98.1 F | HEIGHT: 71 IN | SYSTOLIC BLOOD PRESSURE: 130 MMHG | WEIGHT: 182 LBS | DIASTOLIC BLOOD PRESSURE: 80 MMHG | RESPIRATION RATE: 16 BRPM | HEART RATE: 73 BPM | OXYGEN SATURATION: 98 % | BODY MASS INDEX: 25.48 KG/M2

## 2025-06-04 DIAGNOSIS — S46.002A ROTATOR CUFF INJURY, LEFT, INITIAL ENCOUNTER: ICD-10-CM

## 2025-06-04 DIAGNOSIS — Z00.00 WELL ADULT EXAM: Primary | ICD-10-CM

## 2025-06-04 PROCEDURE — 99396 PREV VISIT EST AGE 40-64: CPT | Performed by: FAMILY MEDICINE

## 2025-06-04 NOTE — PROGRESS NOTES
"Name: Oscar Sellers      : 1975      MRN: [id number]  Encounter Provider: Alejandro Nj MD  Encounter Date: 2025   Encounter department: [facility]  :  Assessment & Plan  Well adult exam         Rotator cuff injury, left, initial encounter  For OR              History of Present Illness   HPI  Review of Systems    Objective   /80 (BP Location: Left arm, Patient Position: Sitting, Cuff Size: Large)   Pulse 73   Temp 98.1 °F (36.7 °C) (Temporal)   Resp 16   Ht 5' 11\" (1.803 m)   Wt 82.6 kg (182 lb)   SpO2 98%   BMI 25.38 kg/m²      Physical Exam    "